# Patient Record
(demographics unavailable — no encounter records)

---

## 2025-04-01 NOTE — ASSESSMENT
[FreeTextEntry1] : Assessment  CKD stage 3A: Renal function is essentially unchanged HTN: SBP slightly low No peripheral edema  Proteinuria, has improved  No recent gout flare up

## 2025-04-01 NOTE — REVIEW OF SYSTEMS
[TextEntry] : Review of Systems: General: No weight loss, No recent fever, chills HEENT: no headache, no change in vision or hearing Pulmonary: No SOB, or cough  CVS: No chest pain, EDWARDS, or change in exercise tolerance  Gastrointestinal: No Nausea/vomiting/constipation/diarrhea : No complaint of dysuria or urinary frequency, no excess foaming  Skin: Denies no new rash, lesions, ulcer  [place] skeletal: No edema  Neurological: No headache, dizziness, vertigo

## 2025-04-01 NOTE — RESULTS/DATA
[TextEntry] : Labs 3/14/25 141/3.7/101/22/25/1.18/gfr65/ca9.9 HGB 15.2 UPCR 76  11/2/22 144/4.7/105/29/27/1.00 eGFR 80, ca 9.4 CBC 7.35/14.5/184 Uric acid 3.9 cbc 5.6/14.3/165  141/4.7/103/27/20/1.13 gfr 65 ca 9.7 upcr 103 vit d 32.8  11/4/20 140/4.1/100/27/24/1.10 gfr 68 ca 10.0 CBC: 6.3/16.6/192 phos: 2.9 PTH: 34 cr 1.08/20/gfr 70 K 4.0/glu 112 Ca 9.6/2.4/d 23.4/calcitriol 22.8 pth 31  1/2/19:cr 1.13/16/gfr 67 K 4.0/glu 117 Ca 9.7/2.2/d 26.8/pth 37 Hg 16.2  6/13/18:cr 1.12/22/gfr 68 K 4.1/glu 116 Ca 9.9/2.3/d 25.6/calcitriol 31.8/pth 31 hg 16.8  12/13/17:cr 1.15/18/gfr 68 K 4.6/glu 92 hg 17.1 Chol 143/hdl 46/ldl 68/tg 143 psa: 0.00  6/14/17:cr 1.28/20/gfr 58 K 4.3/glu 121 Ca 9.7/2.6/d 24.8/calcitriol 34.5/pth 34 Uric 4.7 Hg 16.7

## 2025-04-01 NOTE — HISTORY OF PRESENT ILLNESS
[FreeTextEntry1] : 04/01/25 Reason for visit Follow up for CKD stage 2 HTN He hasn't been in the office for almost 2 years, he had an accident where he fell off a ladder was in the hospital for 4 days and had to wear a brace. He is doing better     PMH recurrent calcium oxalate stone,  sarcoidosis, hypercalcemia, nephrocalcinosis, gouty arthritis,  acute kidney injury due to hypercalcemia/sarcoid nephropathy (resolved 1996), s/p radical prostatectomy   10/30/96 (polyuria, polydipsia, creatinine 4.1, calcium 14.4, 1+ for protein, /108, lung biopsy: Sarcoidosis, renal biopsy: acute tubular injury/calcium deposits (nephrocalcinosis), treatment with high-dose steroids),  1997 (creatinine 1.3/calcium 10.2,,frequent PVCs on Sectral, [place] scan negative for cardiac involvement) calcium oxalate stone /paraaortic [id number],, hyperoxaluria/hypocitraturia/hypercalciuria (  vitamin B6/Polycitra), lithotripsy 2002, acute gouty arthritis 2003, , melanoma removal 2004,radical prostectomy for ca Dec'13

## 2025-04-01 NOTE — PLAN
[TextEntry] : Plan CKD stage 2: Decrease HCTZ to 12.5 mg daily HTN: Continue Metoprolol 25 mg daily, Decrease HCTZ to 12.5 mg daily Gout: Continue Allopurinol 300 mg daily, Colchicine 0.6 mg as needed Maintain physical activity as tolerated Follow up with recommended workups in 1 year